# Patient Record
(demographics unavailable — no encounter records)

---

## 2017-01-27 NOTE — RAD
DATE: 1/27/2017



EXAM: DIGITAL SCREEN BILAT W/CAD



HISTORY: Routine screening



COMPARISON: None available



This study was interpreted with the benefit of Computerized Aided Detection

(CAD).





FINDINGS: There are scattered fibroglandular densities in the breasts in a

slightly nodular pattern. There is a dominant nodule measuring 6 mm in the

right breast at approximately the 10:00 location. Some of its margins are

smooth while others are obscured by adjacent fibroglandular tissues. There is

a benign type calcification in the right breast. No suspicious

microcalcifications are evident.



IMPRESSION: Small right breast nodule. Sonographic evaluation is suggested.





BI-RADS CATEGORY: 0 INCOMPLETE: NEEDS ADDITIONAL IMAGING EVALUATION AND/OR

PRIOR MAMMOGRAMS FOR COMPARISON.



RECOMMENDED FOLLOW-UP: ADD ADDITIONAL IMAGING



PQRS compliance statement: Patient information was entered into a reminder

system with a target due date     for the next mammogram.



Mammography is a sensitive method for finding small breast cancers, but it

does not detect them all and is not a substitute for careful clinical

examination.  A negative mammogram does not negate a clinically suspicious

finding and should not result in delay in biopsying a clinically suspicious

abnormality.



"Our facility is accredited by the [employer] Mammography

Program."

## 2017-02-03 NOTE — RAD
DATE:    2/3/2017



EXAM: DIGITAL DIAGNOSTIC RT, BREAST RIGHT



HISTORY: Suspicious screening study   



COMPARISON: 1/27/2017   



This study was interpreted with the benefit of Computerized Aided Detection

(CAD).





FINDINGS: Spot compression and straight mediolateral views of the right breast

were obtained and correlated with the screening images. The spot compression

cc view confirms the presence of a 6 mm nodule projected over the

anterolateral aspect of the right breast. It demonstrates some smooth and

other partially obscured margins. It is less clearly delineated on the

straight mediolateral views, however, it probably lies superiorly.



On the straight mediolateral views there is also a smooth 4-5 mm superficial

nodule seen inferiorly. It is not clearly delineated on the cc views, probably

obscured by overlying fibroglandular shadows. This does not appear to

correspond to the lateral nodule due to differences in size.





Right breast ultrasound, 2/3/2017:



A targeted ultrasound exam of the upper outer quadrant was performed. No

cystic or solid lesion is identified. There is no sonographic correlate for

the nodule seen on the mammograms. Under mammographic guidance we also marked

the skin surface superiorly over the lateral mammographic nodule and repeated

the ultrasound evaluation. Again, no corresponding breast nodule could be seen

either superior or inferiorly at this level in the lateral aspect of the right

breast.





IMPRESSION:

1. Small right lateral breast nodule with no sonographic correlate. This may

represent a fibroadenoma or lymph node which cannot be sonographically

differentiated from the normal heterogeneous fibroglandular shadows. A

neoplastic etiology cannot be excluded. Follow-up mammography in 4-6 months is

suggested. An attempt at stereotactic biopsy would be a reasonable

alternative.

2. Additional tiny superficial benign-appearing nodule in the inferior aspect

of the right breast. Mammographic follow-up is suggested.





BI-RADS CATEGORY: 3 PROBABLY BENIGN FINDING(S)-SHORT INTERVAL FOLLOW-UP

SUGGESTED



RECOMMENDED FOLLOW-UP: 6M 6 MONTH FOLLOW-UP



PQRS compliance statement: Patient information was entered into a reminder

system with a target due date     for the next mammogram.



Mammography is a sensitive method for finding small breast cancers, but it

does not detect them all and is not a substitute for careful clinical

examination.  A negative mammogram does not negate a clinically suspicious

finding and should not result in delay in biopsying a clinically suspicious

abnormality.



"Our facility is accredited by the [employer] Mammography

Program."